# Patient Record
Sex: FEMALE | Race: WHITE
[De-identification: names, ages, dates, MRNs, and addresses within clinical notes are randomized per-mention and may not be internally consistent; named-entity substitution may affect disease eponyms.]

---

## 2018-05-22 ENCOUNTER — HOSPITAL ENCOUNTER (EMERGENCY)
Dept: HOSPITAL 96 - M.ERS | Age: 48
Discharge: LEFT BEFORE DISCHARGE | End: 2018-05-22
Payer: COMMERCIAL

## 2018-05-22 VITALS — DIASTOLIC BLOOD PRESSURE: 72 MMHG | SYSTOLIC BLOOD PRESSURE: 110 MMHG

## 2018-05-22 VITALS — WEIGHT: 183.01 LBS | BODY MASS INDEX: 29.41 KG/M2 | HEIGHT: 66 IN

## 2018-05-22 DIAGNOSIS — Z88.5: ICD-10-CM

## 2018-05-22 DIAGNOSIS — R07.89: Primary | ICD-10-CM

## 2018-05-22 DIAGNOSIS — F14.10: ICD-10-CM

## 2018-05-22 DIAGNOSIS — Z88.8: ICD-10-CM

## 2018-05-22 DIAGNOSIS — Z90.49: ICD-10-CM

## 2018-05-22 LAB
ABSOLUTE BASOPHILS: 0 THOU/UL (ref 0–0.2)
ABSOLUTE EOSINOPHILS: 0.1 THOU/UL (ref 0–0.7)
ABSOLUTE MONOCYTES: 0.7 THOU/UL (ref 0–1.2)
ALBUMIN SERPL-MCNC: 3.6 G/DL (ref 3.4–5)
ALP SERPL-CCNC: 49 U/L (ref 46–116)
ALT SERPL-CCNC: 28 U/L (ref 30–65)
ANION GAP SERPL CALC-SCNC: 6 MMOL/L (ref 7–16)
AST SERPL-CCNC: 20 U/L (ref 15–37)
BASOPHILS NFR BLD AUTO: 0.5 %
BILIRUB SERPL-MCNC: 0.3 MG/DL
BILIRUB UR-MCNC: NEGATIVE MG/DL
BUN SERPL-MCNC: 11 MG/DL (ref 7–18)
CALCIUM SERPL-MCNC: 9 MG/DL (ref 8.5–10.1)
CHLORIDE SERPL-SCNC: 107 MMOL/L (ref 98–107)
CO2 SERPL-SCNC: 26 MMOL/L (ref 21–32)
COCAINE UR-MCNC: POSITIVE NG/ML
COLOR UR: YELLOW
CREAT SERPL-MCNC: 0.6 MG/DL (ref 0.6–1.3)
EOSINOPHIL NFR BLD: 1.8 %
GLUCOSE SERPL-MCNC: 85 MG/DL (ref 70–99)
GRANULOCYTES NFR BLD MANUAL: 65.6 %
HCT VFR BLD CALC: 40.5 % (ref 37–47)
HGB BLD-MCNC: 13.8 GM/DL (ref 12–15)
KETONES UR STRIP-MCNC: NEGATIVE MG/DL
LIPASE: 238 U/L (ref 73–393)
LYMPHOCYTES # BLD: 1.4 THOU/UL (ref 0.8–5.3)
LYMPHOCYTES NFR BLD AUTO: 21.5 %
MCH RBC QN AUTO: 30.9 PG (ref 26–34)
MCHC RBC AUTO-ENTMCNC: 34.2 G/DL (ref 28–37)
MCV RBC: 90.2 FL (ref 80–100)
MONOCYTES NFR BLD: 10.6 %
MPV: 6.8 FL. (ref 7.2–11.1)
NEUTROPHILS # BLD: 4.2 THOU/UL (ref 1.6–8.1)
NITRITE UR QL STRIP: NEGATIVE
NUCLEATED RBCS: 0 /100WBC
PLATELET COUNT*: 343 THOU/UL (ref 150–400)
POTASSIUM SERPL-SCNC: 3.9 MMOL/L (ref 3.5–5.1)
PROT SERPL-MCNC: 6.7 G/DL (ref 6.4–8.2)
PROT UR QL STRIP: NEGATIVE
RBC # BLD AUTO: 4.48 MIL/UL (ref 4.2–5)
RBC # UR STRIP: NEGATIVE /UL
RDW-CV: 12.9 % (ref 10.5–14.5)
SODIUM SERPL-SCNC: 139 MMOL/L (ref 136–145)
SP GR UR STRIP: 1.01 (ref 1–1.03)
URINE CLARITY: CLEAR
URINE GLUCOSE-RANDOM: NEGATIVE
URINE LEUKOCYTES: NEGATIVE
UROBILINOGEN UR STRIP-ACNC: 0.2 E.U./DL (ref 0.2–1)
WBC # BLD AUTO: 6.5 THOU/UL (ref 4–11)

## 2018-05-23 NOTE — EKG
[place] [address]
Phone:  [phone]                     ELECTROCARDIOGRAM REPORT      
_______________________________________________________________________________
 
Name:       NABIL PARSON                 Room:                      [facility]#:  [id number]      Account #:      [id number]  
Admission:  18     Attend Phys:                         
Discharge:  18     Date of Birth:  [birth year]  
         Report #: [id number]
    [id number]
_______________________________________________________________________________
THIS REPORT FOR:  //name//                      
 
                         [facility] ED
                                       
Test Date:    2018               Test Time:    17:11:17
Pat Name:     NABIL PARSON             Department:   
Patient ID:   [id number]            Room:          
Gender:       F                        Technician:   MARIA ANTONIA NINA
:          1970               Requested By: Sahara Castellon
Order Number: [id number] MD:   Wili Reynoso
                                 Measurements
Intervals                              Axis          
Rate:         88                       P:            4
MS:           143                      QRS:          50
QRSD:         100                      T:            44
QT:           377                                    
QTc:          457                                    
                           Interpretive Statements
Sinus rhythm
ST elev, probable normal early repol pattern
Baseline wander in lead(s) V5
No previous ECG available for comparison
 
Electronically Signed On 2018 16:24:40 CDT by Wili Reynoso
[email or web address]
 
 
 
 
 
 
 
 
 
 
 
 
 
 
 
 
 
 
  <ELECTRONICALLY SIGNED>
                                           By: Wili Reynoso MD, [facility]     
  18     1624
D: 18   _____________________________________
T: 18   Wili Reynoso MD, [facility]       /EPI